# Patient Record
Sex: MALE | Race: BLACK OR AFRICAN AMERICAN | NOT HISPANIC OR LATINO | URBAN - METROPOLITAN AREA
[De-identification: names, ages, dates, MRNs, and addresses within clinical notes are randomized per-mention and may not be internally consistent; named-entity substitution may affect disease eponyms.]

---

## 2024-12-10 ENCOUNTER — OFFICE VISIT (OUTPATIENT)
Dept: URGENT CARE | Age: 29
End: 2024-12-10
Payer: COMMERCIAL

## 2024-12-10 VITALS
RESPIRATION RATE: 16 BRPM | DIASTOLIC BLOOD PRESSURE: 85 MMHG | OXYGEN SATURATION: 94 % | HEART RATE: 80 BPM | SYSTOLIC BLOOD PRESSURE: 134 MMHG | TEMPERATURE: 98.5 F

## 2024-12-10 DIAGNOSIS — S61.212A LACERATION OF RIGHT MIDDLE FINGER WITHOUT FOREIGN BODY WITHOUT DAMAGE TO NAIL, INITIAL ENCOUNTER: Primary | ICD-10-CM

## 2024-12-10 RX ORDER — SULFAMETHOXAZOLE AND TRIMETHOPRIM 800; 160 MG/1; MG/1
1 TABLET ORAL 2 TIMES DAILY
Qty: 14 TABLET | Refills: 0 | Status: SHIPPED | OUTPATIENT
Start: 2024-12-10

## 2024-12-10 RX ORDER — TESTOSTERONE GEL, 1% 10 MG/G
GEL TRANSDERMAL
COMMUNITY

## 2024-12-10 RX ORDER — MUPIROCIN 20 MG/G
OINTMENT TOPICAL
Qty: 22 G | Refills: 0 | Status: SHIPPED | OUTPATIENT
Start: 2024-12-10

## 2024-12-10 ASSESSMENT — ENCOUNTER SYMPTOMS
CONSTITUTIONAL NEGATIVE: 1
WOUND: 1

## 2024-12-10 NOTE — PROGRESS NOTES
Subjective   Patient ID: Jaun Garsia is a 29 y.o. male. They present today with a chief complaint of Injury (PT presents with a cut to his finger thinks he needs stitches. ).    History of Present Illness  Injury happend last night          Past Medical History  Allergies as of 12/10/2024 - Reviewed 12/10/2024   Allergen Reaction Noted    Trace metals Unknown 12/10/2024       (Not in a hospital admission)       History reviewed. No pertinent past medical history.    History reviewed. No pertinent surgical history.         Review of Systems  Review of Systems   Constitutional: Negative.    Skin:  Positive for wound.                                  Objective    Vitals:    12/10/24 0903   BP: 134/85   BP Location: Left arm   Patient Position: Sitting   Pulse: 80   Resp: 16   Temp: 36.9 °C (98.5 °F)   SpO2: 94%     No LMP for male patient.    Physical Exam  Constitutional:       Appearance: Normal appearance.   Skin:     Comments: There is a 1.5 cm irregular laceration with subcutaneous fat exposure on middle phalenx of RMF   Neurological:      Mental Status: He is alert.         Laceration Repair    Date/Time: 12/10/2024 10:21 AM    Performed by: Kamila Amaral MD  Authorized by: Kamila Amaral MD    Consent:     Consent obtained:  Verbal    Consent given by:  Patient    Risks, benefits, and alternatives were discussed: yes      Risks discussed:  Poor cosmetic result and poor wound healing    Alternatives discussed:  No treatment  Universal protocol:     Patient identity confirmed:  Verbally with patient  Anesthesia:     Anesthesia method:  Local infiltration    Local anesthetic:  Lidocaine 1% w/o epi  Laceration details:     Location:  Finger    Finger location:  R long finger    Length (cm):  1.5    Depth (mm):  4  Pre-procedure details:     Preparation:  Patient was prepped and draped in usual sterile fashion  Exploration:     Limited defect created (wound extended): no    Treatment:     Area cleansed with:   Povidone-iodine and saline    Amount of cleaning:  Standard    Debridement:  Moderate    Undermining:  Minimal    Scar revision: no    Skin repair:     Repair method:  Sutures    Suture size:  4-0    Suture material:  Nylon    Suture technique:  Simple interrupted    Number of sutures:  3  Approximation:     Approximation:  Close  Repair type:     Repair type:  Intermediate  Post-procedure details:     Dressing:  Antibiotic ointment, non-adherent dressing and splint for protection      Point of Care Test & Imaging Results from this visit  No results found for this visit on 12/10/24.   No results found.    Diagnostic study results (if any) were reviewed by Kamila Amaral MD.    Assessment/Plan   Allergies, medications, history, and pertinent labs/EKGs/Imaging reviewed by Kamila Amaral MD.     Medical Decision Making      Orders and Diagnoses  There are no diagnoses linked to this encounter.    Medical Admin Record      Patient disposition: Home    Electronically signed by Kamila Amaral MD  9:44 AM

## 2024-12-10 NOTE — PATIENT INSTRUCTIONS
Wash the wound with soap and water daily, pat dry  and apply topical Antibiotic ointment, cover with non adhesive dressing.   Return for suture removal in 7-8 days

## 2024-12-10 NOTE — LETTER
December 10, 2024     Patient: Jaun Garsia   YOB: 1995   Date of Visit: 12/10/2024       To Whom It May Concern:    Jaun Garsia was seen in my clinic on 12/10/2024 at 8:40 am. Please excuse Jaun for his absence from work until 12/11/24.    If you have any questions or concerns, please don't hesitate to call.         Sincerely,         Kamila Amaral MD

## 2024-12-18 ENCOUNTER — HOSPITAL ENCOUNTER (OUTPATIENT)
Dept: RADIOLOGY | Facility: HOSPITAL | Age: 29
Discharge: HOME | End: 2024-12-18
Payer: COMMERCIAL

## 2024-12-18 DIAGNOSIS — S46.211A STRAIN OF MUSCLE, FASCIA AND TENDON OF OTHER PARTS OF BICEPS, RIGHT ARM, INITIAL ENCOUNTER: ICD-10-CM
